# Patient Record
Sex: FEMALE | URBAN - METROPOLITAN AREA
[De-identification: names, ages, dates, MRNs, and addresses within clinical notes are randomized per-mention and may not be internally consistent; named-entity substitution may affect disease eponyms.]

---

## 2017-01-03 ENCOUNTER — COMMUNICATION - HEALTHEAST (OUTPATIENT)
Dept: SLEEP MEDICINE | Facility: CLINIC | Age: 32
End: 2017-01-03

## 2017-01-18 ENCOUNTER — OFFICE VISIT - HEALTHEAST (OUTPATIENT)
Dept: SLEEP MEDICINE | Facility: CLINIC | Age: 32
End: 2017-01-18

## 2017-01-18 DIAGNOSIS — F51.12 INSUFFICIENT SLEEP SYNDROME: ICD-10-CM

## 2017-01-18 DIAGNOSIS — R79.9 ABNORMAL BLOOD CHEMISTRY: ICD-10-CM

## 2017-01-18 DIAGNOSIS — G25.81 RESTLESS LEG SYNDROME: ICD-10-CM

## 2017-01-18 DIAGNOSIS — G47.8 SLEEP DYSFUNCTION WITH SLEEP STAGE DISTURBANCE: ICD-10-CM

## 2017-01-18 DIAGNOSIS — G47.21 CIRCADIAN RHYTHM SLEEP DISORDER, DELAYED SLEEP PHASE TYPE: ICD-10-CM

## 2017-01-18 ASSESSMENT — MIFFLIN-ST. JEOR: SCORE: 1251.64

## 2020-04-14 ENCOUNTER — COMMUNICATION - HEALTHEAST (OUTPATIENT)
Dept: FAMILY MEDICINE | Facility: CLINIC | Age: 35
End: 2020-04-14

## 2020-06-18 ENCOUNTER — COMMUNICATION - HEALTHEAST (OUTPATIENT)
Dept: FAMILY MEDICINE | Facility: CLINIC | Age: 35
End: 2020-06-18

## 2020-06-19 ENCOUNTER — RECORDS - HEALTHEAST (OUTPATIENT)
Dept: FAMILY MEDICINE | Facility: CLINIC | Age: 35
End: 2020-06-19

## 2020-09-01 ENCOUNTER — COMMUNICATION - HEALTHEAST (OUTPATIENT)
Dept: FAMILY MEDICINE | Facility: CLINIC | Age: 35
End: 2020-09-01

## 2020-09-09 ENCOUNTER — OFFICE VISIT - HEALTHEAST (OUTPATIENT)
Dept: FAMILY MEDICINE | Facility: CLINIC | Age: 35
End: 2020-09-09

## 2020-09-09 DIAGNOSIS — F41.9 ANXIETY DISORDER, UNSPECIFIED TYPE: ICD-10-CM

## 2020-09-09 ASSESSMENT — ANXIETY QUESTIONNAIRES
2. NOT BEING ABLE TO STOP OR CONTROL WORRYING: MORE THAN HALF THE DAYS
7. FEELING AFRAID AS IF SOMETHING AWFUL MIGHT HAPPEN: NOT AT ALL
3. WORRYING TOO MUCH ABOUT DIFFERENT THINGS: MORE THAN HALF THE DAYS
1. FEELING NERVOUS, ANXIOUS, OR ON EDGE: MORE THAN HALF THE DAYS
6. BECOMING EASILY ANNOYED OR IRRITABLE: SEVERAL DAYS
5. BEING SO RESTLESS THAT IT IS HARD TO SIT STILL: NOT AT ALL
GAD7 TOTAL SCORE: 8
4. TROUBLE RELAXING: SEVERAL DAYS

## 2020-09-09 ASSESSMENT — PATIENT HEALTH QUESTIONNAIRE - PHQ9: SUM OF ALL RESPONSES TO PHQ QUESTIONS 1-9: 11

## 2021-01-04 ENCOUNTER — HEALTH MAINTENANCE LETTER (OUTPATIENT)
Age: 36
End: 2021-01-04

## 2021-05-27 ASSESSMENT — PATIENT HEALTH QUESTIONNAIRE - PHQ9: SUM OF ALL RESPONSES TO PHQ QUESTIONS 1-9: 11

## 2021-05-28 ASSESSMENT — ANXIETY QUESTIONNAIRES: GAD7 TOTAL SCORE: 8

## 2021-05-30 VITALS — HEIGHT: 63 IN | WEIGHT: 131.5 LBS | BODY MASS INDEX: 23.3 KG/M2

## 2021-06-08 NOTE — PROGRESS NOTES
"Dear Dr. Hayley Haddad MD  [address]  [address]  [place] [address],    Thank you for the opportunity to participate in the care of Antonia Mcdaniel.     She is a 32 y.o.  female patient who comes to the sleep medicine clinic for review of her sleep study. The study was completed on 12/22/2016 which showed the the patient had mild snoring and periodic limb movement of sleep.  I reviewed the results with the patient in detail.  Upon further questioning she states that she would have episodes of having worsening joint pain all over.  It tends to occur in the evening and wouldn't make her want to move.  She has not implemented the recommendations on sleep hygiene since the last visit.    No past medical history on file.    Past Surgical History   Procedure Laterality Date     Breast surgery       Augmentation       Social History     Social History     Marital status: Single     Spouse name: N/A     Number of children: N/A     Years of education: N/A     Occupational History     Not on file.     Social History Main Topics     Smoking status: Never Smoker     Smokeless tobacco: Never Used     Alcohol use No     Drug use: Not on file     Sexual activity: Not on file     Other Topics Concern     Not on file     Social History Narrative       Current Outpatient Prescriptions   Medication Sig Dispense Refill     clonazePAM (KLONOPIN) 1 MG tablet Take 1 tablet (1 mg total) by mouth 3 (three) times a day as needed for anxiety. 60 tablet 0     No current facility-administered medications for this visit.        Allergies   Allergen Reactions     Codeine Itching     Percocet [Oxycodone-Acetaminophen] Itching     Vicodin [Hydrocodone-Acetaminophen] Itching       Physical Exam:  Visit Vitals     BP (!) 88/54     Pulse 70     Ht 5' 2.75\" (1.594 m)     Wt 131 lb 8 oz (59.6 kg)     SpO2 99%     BMI 23.48 kg/m2     BMI:Body mass index is 23.48 kg/(m^2).   GEN: NAD, appropriate for age.  Head: Normocephalic.  Neurological: " Alert, oriented to time, place, and person.  Psych: normal mood, normal affect     Labs/Studies:  - We reviewed the results of the overnight PSG as described on the HPI.     Lab Results   Component Value Date    WBC 10.3 03/02/2016    HGB 13.2 10/17/2016    HCT 37.5 03/02/2016    MCV 95 03/02/2016     03/02/2016         Chemistry        Component Value Date/Time     03/02/2016 1456    K 4.1 03/02/2016 1456     (H) 03/02/2016 1456    CO2 24 03/02/2016 1456    BUN 14 03/02/2016 1456    CREATININE 0.88 03/02/2016 1456    GLU 81 03/02/2016 1456        Component Value Date/Time    CALCIUM 9.4 03/02/2016 1456    ALKPHOS 74 03/02/2016 1456    AST 21 03/02/2016 1456    ALT 23 03/02/2016 1456    BILITOT 0.4 03/02/2016 1456            No results found for: FERRITIN        Assessment and Plan:  In summary Antonia Mcdaniel is a 32 y.o. year old female here for review of her sleep study.  1.  Restless Body Syndrome/Palomo Ekbom disease  I educated the patient on the underlying pathophysiology of Palomo Ekbom disease.  I will also give her handout on this topic.  I will need to get an iron profile to see if this is a concerning cause of her symptomatology.  2.  Circadian Dysregulation  I would like the patient to fill out a 4 week sleep log and to return to clinic to review with me.  3.  Insufficient sleep syndrome  I again recommended patient to try to increase her hours of sleep to at least 1 hour if possible.  4.  Other sleep disturbance     Patient verbalized understanding of these issues, agrees with the plan and all questions were answered today. Patient was given an opportuntity to voice any other symptoms or concerns not listed above. Patient did not have any other symptoms or concerns.      Patient told to return in 4 weeks. Patient instructed to stop at  to schedule appointment before leaving today.    Ehsan Hamilton DO  Board Certified in Internal Medicine and Sleep Medicine  [facility].    We spent a total of 15 minutes of face-to-face encounter and more than 50% of the encounter was used for counseling or coordination of care.    (Note created with Dragon voice recognition and unintended spelling errors and word substitutions may occur)

## 2021-06-09 NOTE — TELEPHONE ENCOUNTER
Patient Returning Call  Reason for call:  Patient  Information relayed to patient:  Dr. Haddad would like her to schedule a visit.  Patient has additional questions:  Yes  If YES, what are your questions/concerns:  She can not afford an office visit, stating it would cost her $300.00 dollars.   She does not use [employer].  Is there any other way to get a letter?  Okay to leave a detailed message?: Yes

## 2021-06-09 NOTE — TELEPHONE ENCOUNTER
I can't, unfortunately.  Her last OV wan in 2016, so I do not know the state of her asthma or her anxiety, and it would require assessment of both along with additional counseling to develop a plan.  VJ

## 2021-06-09 NOTE — TELEPHONE ENCOUNTER
This encounter was created in error - please disregard.   Prescriptions sent to [facility].   Let me know if not improving.

## 2021-06-09 NOTE — TELEPHONE ENCOUNTER
Who is requesting the letter?  Patient   Why is the letter needed? Patient stated that her workplace is requiring all staffs to return to work wearing masks. Patient stated that she need a letter stating that she is unable to wear a mask due to medical reasons such as anxiety and mental breakdown hyperventilate.   How would you like this letter returned?   Okay to leave a detailed message? Yes  [phone]

## 2021-06-09 NOTE — TELEPHONE ENCOUNTER
Called and notified patient of the below message  She is unable to schedule an appointment at this time due to the cost of a visit but if needed she reports she will call back to schedule.

## 2021-06-09 NOTE — TELEPHONE ENCOUNTER
Left message to call back for: letter request  Information to relay to patient:  Below message. Please help arrange a virtual visit upon return phone call.

## 2021-06-09 NOTE — TELEPHONE ENCOUNTER
I recommend a virtual visit to discuss further.  May arrange to have this with one of my partners due to my limited availability.  Will need to determine if modifications can be made to allow her to better tolerate her mask, if she requires additional management of anxiety, or if other work accommodations may allow her to continue to work in an environment that would not necessitate the use of a mask.  VJ

## 2021-06-11 NOTE — TELEPHONE ENCOUNTER
New Appointment Needed  What is the reason for the visit:    New Pt Phys, Patient has not been in for over 3 yrs  Provider Preference: PCP only  How soon do you need to be seen?: as soon as possible, Anxiety and depression  Waitlist offered?: No  Okay to leave a detailed message:  Yes

## 2021-06-11 NOTE — PATIENT INSTRUCTIONS - HE
Continue to work with your employee assistance program for a cognitive behavioral therapist and management of your anxiety.

## 2021-06-11 NOTE — TELEPHONE ENCOUNTER
Who is calling:  Patient   Reason for Call:  Tried to schedule office visit- first opening is in October. Patient would like to be seen sooner than that. Please call patient  Date of last appointment with primary care: n/a  Okay to leave a detailed message: Yes

## 2021-06-11 NOTE — TELEPHONE ENCOUNTER
Can you help arrange an office visit regarding depression and anxiety with Dr. Haddad and a physical separately?
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
## 2021-06-11 NOTE — TELEPHONE ENCOUNTER
If needing to be seen for anxiety and depression, I recommend that she do this is separate from a physical.  Yes, you may schedule her for a 20-minute visit.  VJ

## 2021-06-20 NOTE — LETTER
Letter by Hayley Haddad MD at      Author: Hayley Haddad MD Service: -- Author Type: --    Filed:  Encounter Date: 9/9/2020 Status: (Other)         September 9, 2020     Patient: Antonia Mcdaniel   YOB: 1985   Date of Visit: 9/9/2020       To Whom It May Concern:    It is my medical opinion that Antonia Mcdaniel has a medical condition that prevents her from successfully wearing a mask or face covering.  In my opinion, she should work from home due to a medical condition while mandatory mask policy is in place at her place of employment that requires face covering despite medical condition.    If you have any questions or concerns, please don't hesitate to call.    Sincerely,        Electronically signed by Hayley Haddad MD

## 2021-10-11 ENCOUNTER — HEALTH MAINTENANCE LETTER (OUTPATIENT)
Age: 36
End: 2021-10-11

## 2022-01-30 ENCOUNTER — HEALTH MAINTENANCE LETTER (OUTPATIENT)
Age: 37
End: 2022-01-30

## 2022-09-24 ENCOUNTER — HEALTH MAINTENANCE LETTER (OUTPATIENT)
Age: 37
End: 2022-09-24

## 2023-02-27 ENCOUNTER — MYC MEDICAL ADVICE (OUTPATIENT)
Dept: FAMILY MEDICINE | Facility: CLINIC | Age: 38
End: 2023-02-27

## 2023-05-08 ENCOUNTER — HEALTH MAINTENANCE LETTER (OUTPATIENT)
Age: 38
End: 2023-05-08